# Patient Record
Sex: FEMALE | Race: WHITE | NOT HISPANIC OR LATINO | URBAN - METROPOLITAN AREA
[De-identification: names, ages, dates, MRNs, and addresses within clinical notes are randomized per-mention and may not be internally consistent; named-entity substitution may affect disease eponyms.]

---

## 2019-01-03 ENCOUNTER — EMERGENCY (EMERGENCY)
Facility: HOSPITAL | Age: 39
LOS: 1 days | Discharge: HOME | End: 2019-01-03
Attending: EMERGENCY MEDICINE
Payer: COMMERCIAL

## 2019-01-03 VITALS
SYSTOLIC BLOOD PRESSURE: 127 MMHG | OXYGEN SATURATION: 98 % | WEIGHT: 154.98 LBS | RESPIRATION RATE: 18 BRPM | HEART RATE: 76 BPM | HEIGHT: 67 IN | DIASTOLIC BLOOD PRESSURE: 84 MMHG | TEMPERATURE: 98 F

## 2019-01-03 PROCEDURE — 99283 EMERGENCY DEPT VISIT LOW MDM: CPT

## 2019-01-03 PROCEDURE — 73562 X-RAY EXAM OF KNEE 3: CPT

## 2019-01-03 PROCEDURE — 73562 X-RAY EXAM OF KNEE 3: CPT | Mod: 26,LT

## 2019-01-03 NOTE — ED PROVIDER NOTE - OBJECTIVE STATEMENT
This patient is a 38 year old woman who presents to the ER c/o left knee dislocation.  Patient was sitting on her couch and states that when her dog came on her lap and rolled onto her left leg her knee dislocated.  It then went back into place.  She denies pain at this time.  Patient states that she had a similar incident happen to her right knee many years ago that required surgery.

## 2019-01-03 NOTE — ED ADULT TRIAGE NOTE - CHIEF COMPLAINT QUOTE
States rolled knee on dog and popped out of place and states felt it pop back in. HX knee dislocation surgery in past.

## 2024-02-04 ENCOUNTER — EMERGENCY (EMERGENCY)
Facility: HOSPITAL | Age: 44
LOS: 1 days | Discharge: HOME | End: 2024-02-04
Attending: STUDENT IN AN ORGANIZED HEALTH CARE EDUCATION/TRAINING PROGRAM
Payer: COMMERCIAL

## 2024-02-04 VITALS
RESPIRATION RATE: 18 BRPM | SYSTOLIC BLOOD PRESSURE: 178 MMHG | OXYGEN SATURATION: 100 % | HEART RATE: 86 BPM | DIASTOLIC BLOOD PRESSURE: 90 MMHG

## 2024-02-04 VITALS
RESPIRATION RATE: 16 BRPM | DIASTOLIC BLOOD PRESSURE: 98 MMHG | OXYGEN SATURATION: 99 % | WEIGHT: 164.91 LBS | HEIGHT: 65 IN | SYSTOLIC BLOOD PRESSURE: 159 MMHG | HEART RATE: 84 BPM | TEMPERATURE: 98 F

## 2024-02-04 LAB
ALBUMIN SERPL ELPH-MCNC: 4 G/DL — SIGNIFICANT CHANGE UP (ref 3.3–5.2)
ALP SERPL-CCNC: 25 U/L — LOW (ref 40–120)
ALT FLD-CCNC: 23 U/L — SIGNIFICANT CHANGE UP
ANION GAP SERPL CALC-SCNC: 16 MMOL/L — SIGNIFICANT CHANGE UP (ref 5–17)
APTT BLD: 29.2 SEC — SIGNIFICANT CHANGE UP (ref 24.5–35.6)
AST SERPL-CCNC: 21 U/L — SIGNIFICANT CHANGE UP
BASOPHILS # BLD AUTO: 0.05 K/UL — SIGNIFICANT CHANGE UP (ref 0–0.2)
BASOPHILS NFR BLD AUTO: 0.7 % — SIGNIFICANT CHANGE UP (ref 0–2)
BILIRUB SERPL-MCNC: 0.3 MG/DL — LOW (ref 0.4–2)
BUN SERPL-MCNC: 11.4 MG/DL — SIGNIFICANT CHANGE UP (ref 8–20)
CALCIUM SERPL-MCNC: 8.8 MG/DL — SIGNIFICANT CHANGE UP (ref 8.4–10.5)
CHLORIDE SERPL-SCNC: 100 MMOL/L — SIGNIFICANT CHANGE UP (ref 96–108)
CO2 SERPL-SCNC: 21 MMOL/L — LOW (ref 22–29)
CREAT SERPL-MCNC: 0.96 MG/DL — SIGNIFICANT CHANGE UP (ref 0.5–1.3)
EGFR: 76 ML/MIN/1.73M2 — SIGNIFICANT CHANGE UP
EOSINOPHIL # BLD AUTO: 0.21 K/UL — SIGNIFICANT CHANGE UP (ref 0–0.5)
EOSINOPHIL NFR BLD AUTO: 3.1 % — SIGNIFICANT CHANGE UP (ref 0–6)
GLUCOSE SERPL-MCNC: 122 MG/DL — HIGH (ref 70–99)
HCT VFR BLD CALC: 41.7 % — SIGNIFICANT CHANGE UP (ref 34.5–45)
HGB BLD-MCNC: 14.7 G/DL — SIGNIFICANT CHANGE UP (ref 11.5–15.5)
IMM GRANULOCYTES NFR BLD AUTO: 0.3 % — SIGNIFICANT CHANGE UP (ref 0–0.9)
INR BLD: 0.94 RATIO — SIGNIFICANT CHANGE UP (ref 0.85–1.18)
LYMPHOCYTES # BLD AUTO: 1.33 K/UL — SIGNIFICANT CHANGE UP (ref 1–3.3)
LYMPHOCYTES # BLD AUTO: 19.4 % — SIGNIFICANT CHANGE UP (ref 13–44)
MCHC RBC-ENTMCNC: 33.3 PG — SIGNIFICANT CHANGE UP (ref 27–34)
MCHC RBC-ENTMCNC: 35.3 GM/DL — SIGNIFICANT CHANGE UP (ref 32–36)
MCV RBC AUTO: 94.3 FL — SIGNIFICANT CHANGE UP (ref 80–100)
MONOCYTES # BLD AUTO: 0.35 K/UL — SIGNIFICANT CHANGE UP (ref 0–0.9)
MONOCYTES NFR BLD AUTO: 5.1 % — SIGNIFICANT CHANGE UP (ref 2–14)
NEUTROPHILS # BLD AUTO: 4.91 K/UL — SIGNIFICANT CHANGE UP (ref 1.8–7.4)
NEUTROPHILS NFR BLD AUTO: 71.4 % — SIGNIFICANT CHANGE UP (ref 43–77)
PLATELET # BLD AUTO: 262 K/UL — SIGNIFICANT CHANGE UP (ref 150–400)
POTASSIUM SERPL-MCNC: 4 MMOL/L — SIGNIFICANT CHANGE UP (ref 3.5–5.3)
POTASSIUM SERPL-SCNC: 4 MMOL/L — SIGNIFICANT CHANGE UP (ref 3.5–5.3)
PROT SERPL-MCNC: 6.7 G/DL — SIGNIFICANT CHANGE UP (ref 6.6–8.7)
PROTHROM AB SERPL-ACNC: 10.5 SEC — SIGNIFICANT CHANGE UP (ref 9.5–13)
RBC # BLD: 4.42 M/UL — SIGNIFICANT CHANGE UP (ref 3.8–5.2)
RBC # FLD: 12.6 % — SIGNIFICANT CHANGE UP (ref 10.3–14.5)
SODIUM SERPL-SCNC: 137 MMOL/L — SIGNIFICANT CHANGE UP (ref 135–145)
WBC # BLD: 6.87 K/UL — SIGNIFICANT CHANGE UP (ref 3.8–10.5)
WBC # FLD AUTO: 6.87 K/UL — SIGNIFICANT CHANGE UP (ref 3.8–10.5)

## 2024-02-04 PROCEDURE — 99284 EMERGENCY DEPT VISIT MOD MDM: CPT | Mod: 25

## 2024-02-04 PROCEDURE — 73564 X-RAY EXAM KNEE 4 OR MORE: CPT

## 2024-02-04 PROCEDURE — 36415 COLL VENOUS BLD VENIPUNCTURE: CPT

## 2024-02-04 PROCEDURE — 96374 THER/PROPH/DIAG INJ IV PUSH: CPT

## 2024-02-04 PROCEDURE — 85025 COMPLETE CBC W/AUTO DIFF WBC: CPT

## 2024-02-04 PROCEDURE — 99285 EMERGENCY DEPT VISIT HI MDM: CPT

## 2024-02-04 PROCEDURE — 84702 CHORIONIC GONADOTROPIN TEST: CPT

## 2024-02-04 PROCEDURE — 80053 COMPREHEN METABOLIC PANEL: CPT

## 2024-02-04 PROCEDURE — 85610 PROTHROMBIN TIME: CPT

## 2024-02-04 PROCEDURE — 85730 THROMBOPLASTIN TIME PARTIAL: CPT

## 2024-02-04 PROCEDURE — 73564 X-RAY EXAM KNEE 4 OR MORE: CPT | Mod: 26,LT,76

## 2024-02-04 RX ORDER — FENTANYL CITRATE 50 UG/ML
50 INJECTION INTRAVENOUS ONCE
Refills: 0 | Status: DISCONTINUED | OUTPATIENT
Start: 2024-02-04 | End: 2024-02-04

## 2024-02-04 RX ORDER — SODIUM CHLORIDE 9 MG/ML
1000 INJECTION INTRAMUSCULAR; INTRAVENOUS; SUBCUTANEOUS ONCE
Refills: 0 | Status: COMPLETED | OUTPATIENT
Start: 2024-02-04 | End: 2024-02-04

## 2024-02-04 RX ADMIN — SODIUM CHLORIDE 1000 MILLILITER(S): 9 INJECTION INTRAMUSCULAR; INTRAVENOUS; SUBCUTANEOUS at 11:13

## 2024-02-04 RX ADMIN — FENTANYL CITRATE 50 MICROGRAM(S): 50 INJECTION INTRAVENOUS at 11:10

## 2024-02-04 NOTE — ED ADULT NURSE NOTE - NSFALLRISKINTERV_ED_ALL_ED

## 2024-02-04 NOTE — ED PROVIDER NOTE - CARE PROVIDER_API CALL
Oswald Hernandez  Orthopaedic Surgery  [address] [place] [id number]  Phone: [phone]  Fax: [phone]  Follow Up Time: 1-3 Days

## 2024-02-04 NOTE — ED ADULT TRIAGE NOTE - CHIEF COMPLAINT QUOTE
pt BIBA from home c/o pain to left knee after her large dog ran into her leg sideways. As per EMS, patella vs knee dislocation. Unable to examine patient secondary to pain

## 2024-02-04 NOTE — ED ADULT NURSE NOTE - OBJECTIVE STATEMENT
c/o left knee pain and deformity. Pt stated her dog ran into her knee and she felt immediate pain and spasms to left knee. Pt denies HA, dizziness, SOB, N/V/D, CP, palpitations, numbness/tingling. Pt Aox4, speaking coherently, respirations even and unlabored on RA, skin warm and dry, pt able to move all extremities.  in place.

## 2024-02-04 NOTE — ED PROVIDER NOTE - NSFOLLOWUPINSTRUCTIONS_ED_ALL_ED_FT
Please follow-up with an Orthopedics doctor.  Please call for an appointment in the next 48 hours but if you cannot follow-up with your primary care doctor please return to the Emergency Department for any urgent issues.    You were given a copy of the tests performed today.  Please bring the results with you and review them with your primary care doctor.    If you have any worsening of symptoms or any other concerns please return to the Emergency Department immediately.    Please continue taking your home medications as directed.    ----------  Patellar Dislocation  A person's knee joint, showing the femur and the patella.  The kneecap (patella) is located in a groove in front of the lower end of your thighbone (femur). This groove is called the patellofemoral groove. A patellar dislocation occurs when your kneecap (patella) slips all the way out of the groove. It usually occurs toward the outside of the leg. If the patella slips partly out of the groove, it is called a patellar subluxation.    What are the causes?  This condition is caused by:  A force on the knee.  Sports injuries.  Twisting the knee when the foot is planted.  What increases the risk?  A patellar dislocation is more likely to occur in:  Women.  People who play certain types of sports, including:  Sports that have quick turns or changes in direction, or where there is contact, like soccer.  Sports that require jumping, such as basketball or volleyball.  Sports in which cleats are worn.  People who have had this condition before.  What are the signs or symptoms?  Symptoms of this condition include:  Sudden, severe knee pain.  Swelling of the knee.  Not being able to straighten or bend the knee.  Numbness and tingling or skin that is cool or pale below the injured knee.  A popping sensation, followed by a feeling that something is out of place.  A misshapen knee.  Being able to move the kneecap too much from left to right (hypermobile patella).  How is this diagnosed?  This condition may be diagnosed with:  A physical exam.  An X-ray or MRI. These may be done to see:  The position of the patella and ligaments that hold the patella in place.  If a bone is broken.  The alignment of the knee.  Any damage to the cartilage.  In some cases, your health care provider may look inside your knee joint with a surgical instrument called an arthroscope. This may be done to make sure you have no loose cartilage in your joint.    How is this treated?  Your patella may move back into place on its own when you straighten your knee, or your health care provider will move it back into place. After your patella is back in its normal position, other treatments may be done, including:  Wearing a knee brace to keep your knee from moving (immobilized) while it heals.  Doing exercises that help improve strength and movement in your knee. Your health care provider may recommend that you see a physical therapist.  Taking medicine or applying ice to help with pain and inflammation.  Having surgery to prevent the patella from slipping out of place or to clean out any loose cartilage in your joint. This may be needed if other treatments do not help or if the condition keeps happening.  Follow these instructions at home:  If you have a removable brace:    A brace on a person's knee.  Wear the brace as told by your health care provider. Remove it only as told by your health care provider.  Check the skin around the brace every day. Tell your health care provider about any concerns.  Loosen the brace if your toes tingle, become numb, or turn cold and blue.  Keep the brace dry and clean.  If the brace is not waterproof:  Do not let it get wet.  Cover it with a watertight covering when you take a bath or shower.  Managing pain, stiffness, and swelling    Bag of ice on a towel on the skin.  If directed, put ice on the injured area. To do this:  If you have a removable brace, remove it as told by your health care provider.  Put ice in a plastic bag.  Place a towel between your skin and the bag.  Leave the ice on for 20 minutes, 2–3 times a day.  Remove the ice if your skin turns bright red. This is very important. If you cannot feel pain, heat, or cold, you have a greater risk of damage to the area.  Move your toes often to avoid stiffness and to lessen swelling.  Raise (elevate) the injured area above the level of your heart while you are sitting or lying down.  Activity    Do not use the injured limb to support your body weight until your health care provider says that you can. Use crutches as instructed.  Do exercises as told by your health care provider.  Return to your normal activities as instructed by your health care provider. Ask what activities are safe for you.  General instructions    Take over-the-counter and prescription medicines only as told by your health care provider.  Do not use any products that contain nicotine or tobacco. These products include cigarettes, chewing tobacco, and vaping devices, such as e-cigarettes. These can delay bone healing. If you need help quitting, ask your health care provider.  Keep all follow-up visits. This is important.  How is this prevented?  Stretch before and after any physical activity.  Give your body time to rest between periods of activity.  Make sure you use equipment that fits you.  Protect yourself against falls and injuries by doing activities in a safe way.  Contact a health care provider if:  Your pain or swelling does not get better.  Your knee catches or locks.  You have more warmth or redness (inflammation) in the knee.  Get help right away if:  You are unable to bend your knee.  The pain in your knee gets worse and is not relieved by medicine.  Your patella slips out of its normal position again.  You have new swelling, pain, or tenderness in any area of your affected leg.  Summary  Patellar dislocation occurs when your kneecap (patella) slips all the way out of the groove.  If your patella does not move back into place on its own, your health care provider will move it back into place.  Treatment may involve icing, medicine, a knee brace, and exercises as told by your health care provider.  In some cases, surgery may be done to prevent the patella from slipping out of place or to clean out any loose cartilage in your joint.  This information is not intended to replace advice given to you by your health care provider. Make sure you discuss any questions you have with your health care provider.

## 2024-02-04 NOTE — ED PROVIDER NOTE - PHYSICAL EXAMINATION
VITALS: reviewed  GEN: No apparent distress, A & O x 4  HEAD/EYES: NC/AT, PERRL, EOMI, anicteric sclerae, no conjunctival pallor  ENT: mucus membranes moist, trachea midline, no JVD, neck is supple  RESP: lungs CTA with equal breath sounds bilaterally, chest wall nontender and atraumatic  CV: heart with reg rhythm S1, S2, no murmur; distal pulses intact and symmetric bilaterally  ABDOMEN: normoactive bowel sounds, soft, nondistended, nontender, no palpable masses  : no CVAT  MSK: the back is without midline or lateral tenderness, there is no spinal deformity or stepoff and the back is ranged painlessly. the neck has no midline tenderness, deformity, or stepoff, and is ranged painlessly.  Knee Exam  Sensation: sensation intact to light touch in 5th/1st finger volar tip, inner/outer legs.  Motor: 5/5 strength of plantar/dorsal flexion of the ankle, Unable extension/flexion of the knee 2/2 pain.  ROM: full range of motion of right/left ankle, L patellar tenderness. Limited ROM on L knee 2/2 pain  Skin/Inspection: Displaced left patellar w/ tenderness. No erythema, no abrasion, no bruises, no effusion appreciated.   Vascular: CRT<2sec in all digits.      SKIN: warm, dry, no rash, no bruising, no cyanosis.  NEURO: alert, mentating appropriately, no facial asymmetry. gross sensation, motor, coordination are intact  PSYCH: Affect appropriate

## 2024-02-04 NOTE — ED PROVIDER NOTE - PATIENT PORTAL LINK FT
You can access the FollowMyHealth Patient Portal offered by [facility] by registering at the following website: [email or web address]. By joining [facility]’s FollowMyHealth portal, you will also be able to view your health information using other applications (apps) compatible with our system.

## 2024-02-04 NOTE — ED PROVIDER NOTE - ATTENDING CONTRIBUTION TO CARE
42 yo F BIBEMS from home c/o L knee pain after her dog ran into her leg sideways. Denies fall, head strike, or any other injury. Pt reports severe L knee pain. Has history of patellar dislocations in the past.  Ap - foot neurovascularly intact. patella self reduced as patient was moving in the bed. knee brace. outpt f/u with ortho

## 2024-02-04 NOTE — ED PROVIDER NOTE - OBJECTIVE STATEMENT
A 42 yo F with hx of L patella dislocation, BIBEMS from home c/o L knee pain after her dog ran into her leg sideways. Denies fall, head strike, or any other injury. Pt reports severe L knee pain.

## 2024-02-05 DIAGNOSIS — M25.562 PAIN IN LEFT KNEE: ICD-10-CM

## 2024-02-05 PROBLEM — S83.006A UNSPECIFIED DISLOCATION OF UNSPECIFIED PATELLA, INITIAL ENCOUNTER: Chronic | Status: ACTIVE | Noted: 2024-02-04

## 2024-02-05 PROBLEM — Z00.00 ENCOUNTER FOR PREVENTIVE HEALTH EXAMINATION: Status: ACTIVE | Noted: 2024-02-05

## 2024-02-06 ENCOUNTER — APPOINTMENT (OUTPATIENT)
Age: 44
End: 2024-02-06
Payer: COMMERCIAL

## 2024-02-06 VITALS
HEIGHT: 68 IN | WEIGHT: 150 LBS | DIASTOLIC BLOOD PRESSURE: 96 MMHG | BODY MASS INDEX: 22.73 KG/M2 | HEART RATE: 96 BPM | SYSTOLIC BLOOD PRESSURE: 142 MMHG

## 2024-02-06 DIAGNOSIS — Z82.3 FAMILY HISTORY OF STROKE: ICD-10-CM

## 2024-02-06 DIAGNOSIS — F17.200 NICOTINE DEPENDENCE, UNSPECIFIED, UNCOMPLICATED: ICD-10-CM

## 2024-02-06 DIAGNOSIS — Z82.49 FAMILY HISTORY OF ISCHEMIC HEART DISEASE AND OTHER DISEASES OF THE CIRCULATORY SYSTEM: ICD-10-CM

## 2024-02-06 DIAGNOSIS — F12.90 CANNABIS USE, UNSPECIFIED, UNCOMPLICATED: ICD-10-CM

## 2024-02-06 DIAGNOSIS — Z78.9 OTHER SPECIFIED HEALTH STATUS: ICD-10-CM

## 2024-02-06 PROCEDURE — 73564 X-RAY EXAM KNEE 4 OR MORE: CPT | Mod: LT

## 2024-02-06 PROCEDURE — 99204 OFFICE O/P NEW MOD 45 MIN: CPT

## 2024-02-06 RX ORDER — IBUPROFEN 800 MG/1
TABLET, FILM COATED ORAL
Refills: 0 | Status: ACTIVE | COMMUNITY

## 2024-02-06 NOTE — DISCUSSION/SUMMARY
[Medication Risks Reviewed] : Medication risks reviewed [Other: ____] : in [unfilled] [Surgical risks reviewed] : Surgical risks reviewed [de-identified] : 43 year old female presents today for an initial consultation of her left knee pain.  She does have a history of patellar dislocations the last was 4 years ago.  She is having significant pain and swelling in the knee.  I am concerned that she could have an osteochondral defect based on her most recent dislocation.  For that reason I recommend an MRI of her left knee for further evaluation and management.  She will continue to remain weightbearing as tolerated with the knee locked in extension with the use of her brace.  I will see her back after the MRI for repeat evaluation.  All questions were asked and answered.  She was advised to rest ice and elevate the leg as well as take anti-inflammatories including Tylenol and ibuprofen as needed for the pain

## 2024-02-06 NOTE — PHYSICAL EXAM
[Crutches] : ambulates with crutches [de-identified] : GENERAL APPEARANCE: Well nourished and hydrated, pleasant, alert, and oriented x 3. Appears their stated age.  HEENT: Normocephalic, extraocular eye motion intact. Nasal septum midline. Oral cavity clear. External auditory canal clear.  RESPIRATORY: Breath sounds clear and audible in all lobes. No wheezing, No accessory muscle use. CARDIOVASCULAR: No apparent abnormalities. No lower leg edema. No varicosities. Pedal pulses are palpable. NEUROLOGIC: Sensation is normal, no muscle weakness in the upper or lower extremities. DERMATOLOGIC: No apparent skin lesions, moist, warm, no rash. SPINE: Cervical spine appears normal and moves freely; thoracic spine appears normal and moves freely; lumbosacral spine appears normal and moves freely, normal, nontender. MUSCULOSKELETAL: Hands, wrists, and elbows are normal and move freely, shoulders are normal and move freely.  PSYCHIATRIC: Oriented to person, place, and time, insight and judgement were intact and the affect was normal. [de-identified] : Left knee exam shows large effusion, ROM was not checked due to the pain, tenderness around the patella, and full extensor mechanism strength. 5/5 motor strength in bilateral lower extremities. Sensory: Intact in bilateral lower extremities. DTRs: Biceps, brachioradialis, triceps, patellar, ankle and plantar 2+ and symmetric bilaterally. Pulses: dorsalis pedis, posterior tibial, femoral, popliteal, and radial 2+ and symmetric bilaterally. [de-identified] : 01/03/2019 - [facility]: 3 Views X-Ray of the Left Knee - IMPRESSION: * No radiographic osseous pathology. * Patella dagoberto deformity. If pain persist despite conservative therapy and soft tissue internal derangement or occult fracture is clinically suspected follow-up MRI recommended.  02/04/2024 - [facility]: Complete 4+ Views X-Ray of the Left Knee - IMPRESSION: * Reduction of patellar dislocation.  4 views of the left knee obtained the office today show no acute fracture or dislocation.  Possible small avulsion is noted off of the patella.  Patella alto is noted

## 2024-02-06 NOTE — ADDENDUM
[FreeTextEntry1] : This note was written by Kinjal Tovar, acting as the  for Dr. Montiel. This note accurately reflects the work and decisions made by Dr. Montiel.

## 2024-02-06 NOTE — HISTORY OF PRESENT ILLNESS
[Pain Location] : pain [] : left knee [Worsening] : worsening [Standing] : standing [Constant] : ~He/She~ states the symptoms seem to be constant [Sitting] : worsened by sitting [Running] : worsened by running [Walking] : worsened by walking [Knee Flexion] : worsened with knee flexion [Knee Extension] : worsened with knee extension [NSAIDs] : relieved by nonsteroidal anti-inflammatory drugs [___ days] : [unfilled] day(s) ago [Ice] : relieved by ice [Rest] : relieved by rest [de-identified] : 43 year old female presents today for an initial consultation for her left knee pain. She states the pain has been ongoing for the past 2 days following her most recent dislocation after her dog ran into the side of her knee. The patient has been taking ibuprofen and Advil as needed for the pain in her knee. The patient states that she has dislocated her bilateral knees on 3 occasions. Her first dislocation was 20 years ago on the right side and underwent surgery. Her 2nd dislocation occurred 5 years ago but it clicked back into place on its own as soon as she was placed on the stretcher. She states the recent dislocation was cause by her 200+ pound dog running into her from the side again like the previous dislocations but this time the knee did not pop back into place until she did it herself in the hospital after being provided pain medication. However, she states this time was different as it did not click into place, but rather slid/rolled back into place. The 3rd dislocation occurred on 02/04/2024. She states her ankle did not swell today. She is ambulating with the use of crutched and is wearing a knee brace for additional support. She works in a bank and stands all day. She states she has to drive daily to [place] for work. There is still significant swelling in the patient's knee. The patient states she is an avid  and  in her free time. [de-identified] : going up and down the stairs, rising from a seated position

## 2024-02-14 ENCOUNTER — OUTPATIENT (OUTPATIENT)
Dept: OUTPATIENT SERVICES | Facility: HOSPITAL | Age: 44
LOS: 1 days | End: 2024-02-14
Payer: COMMERCIAL

## 2024-02-14 ENCOUNTER — APPOINTMENT (OUTPATIENT)
Dept: MRI IMAGING | Facility: CLINIC | Age: 44
End: 2024-02-14
Payer: COMMERCIAL

## 2024-02-14 DIAGNOSIS — Z00.8 ENCOUNTER FOR OTHER GENERAL EXAMINATION: ICD-10-CM

## 2024-02-14 PROCEDURE — 73721 MRI JNT OF LWR EXTRE W/O DYE: CPT | Mod: 26,LT

## 2024-02-14 PROCEDURE — 73721 MRI JNT OF LWR EXTRE W/O DYE: CPT

## 2024-02-22 ENCOUNTER — APPOINTMENT (OUTPATIENT)
Dept: ORTHOPEDIC SURGERY | Facility: CLINIC | Age: 44
End: 2024-02-22
Payer: COMMERCIAL

## 2024-02-22 PROCEDURE — 99441: CPT

## 2024-03-05 ENCOUNTER — APPOINTMENT (OUTPATIENT)
Dept: ORTHOPEDIC SURGERY | Facility: CLINIC | Age: 44
End: 2024-03-05
Payer: COMMERCIAL

## 2024-03-05 VITALS
WEIGHT: 150 LBS | HEIGHT: 68 IN | HEART RATE: 96 BPM | SYSTOLIC BLOOD PRESSURE: 155 MMHG | DIASTOLIC BLOOD PRESSURE: 89 MMHG | BODY MASS INDEX: 22.73 KG/M2

## 2024-03-05 DIAGNOSIS — S83.005A UNSPECIFIED DISLOCATION OF LEFT PATELLA, INITIAL ENCOUNTER: ICD-10-CM

## 2024-03-05 PROCEDURE — 27508 TREATMENT OF THIGH FRACTURE: CPT

## 2024-03-05 PROCEDURE — 99214 OFFICE O/P EST MOD 30 MIN: CPT | Mod: 25

## 2024-03-05 NOTE — HISTORY OF PRESENT ILLNESS
[Pain Location] : pain [] : left knee [Worsening] : worsening [___ wks] : [unfilled] week(s) ago [Standing] : standing [Constant] : ~He/She~ states the symptoms seem to be constant [Sitting] : worsened by sitting [Running] : worsened by running [Walking] : worsened by walking [Knee Flexion] : worsened with knee flexion [Knee Extension] : worsened with knee extension [NSAIDs] : relieved by nonsteroidal anti-inflammatory drugs [Ice] : relieved by ice [Rest] : relieved by rest [de-identified] : 43 year old female presents today for follow-up of her left knee pain.  Patient has been using crutches as well as her knee immobilizer.  She states the pain has been improving.  However she does feel stiffness in the knee.  Has not yet started physical therapy.  Denies any falls or trauma [de-identified] : going up and down the stairs, rising from a seated position

## 2024-03-05 NOTE — PHYSICAL EXAM
[Crutches] : ambulates with crutches [de-identified] : Left knee exam shows mild effusion, ROM 5-90, tenderness around the patella, and full extensor mechanism strength. TTP over distal femur 5/5 motor strength in bilateral lower extremities. Sensory: Intact in bilateral lower extremities. DTRs: Biceps, brachioradialis, triceps, patellar, ankle and plantar 2+ and symmetric bilaterally. Pulses: dorsalis pedis, posterior tibial, femoral, popliteal, and radial 2+ and symmetric bilaterally. [de-identified] : 02/14/2024 - [facility]: MRI of the Left Knee without Contrast - IMPRESSION: (1) Sequelae of prior patellar dislocation including tear of the medial patellofemoral ligament/medial extensor retinaculum at its attachment on the patella, osseous contusion of the medial patella, and low-grade impaction fracture of the lateral femoral condyle. (2) No disruption of the articular cartilage. (3) Small knee joint effusion. (4) Edema in the subcutaneous fat surrounding the knee joint.  02/04/2024 - [facility]: Complete 4+ Views X-Ray of the Left Knee - IMPRESSION: * Reduction of patellar dislocation

## 2024-03-05 NOTE — DISCUSSION/SUMMARY
[Medication Risks Reviewed] : Medication risks reviewed [Surgical risks reviewed] : Surgical risks reviewed [de-identified] : Patient is a 43-year-old female here today status post left knee patella dislocation and impaction fracture of the distal femoral condyle.  I recommended continued conservative treatment at this time.  We discussed low impact activity and exercise.  I recommend that she DC the brace.  We recommended to begin physical therapy.  I have talked her about VMO strengthening.  I discussed Tylenol and anti-inflammatories as needed for the pain.  I will see her back in 2 months for repeat evaluation and x-ray.  Sooner if any issues arise.  All questions were asked and answered

## 2024-05-08 ENCOUNTER — APPOINTMENT (OUTPATIENT)
Dept: ORTHOPEDIC SURGERY | Facility: CLINIC | Age: 44
End: 2024-05-08
Payer: COMMERCIAL

## 2024-05-08 VITALS
HEART RATE: 87 BPM | DIASTOLIC BLOOD PRESSURE: 93 MMHG | SYSTOLIC BLOOD PRESSURE: 143 MMHG | HEIGHT: 68 IN | WEIGHT: 150 LBS | BODY MASS INDEX: 22.73 KG/M2

## 2024-05-08 DIAGNOSIS — S72.402D UNSPECIFIED FRACTURE OF LOWER END OF LEFT FEMUR, SUBSEQUENT ENCOUNTER FOR CLOSED FRACTURE WITH ROUTINE HEALING: ICD-10-CM

## 2024-05-08 PROCEDURE — 73560 X-RAY EXAM OF KNEE 1 OR 2: CPT | Mod: LT

## 2024-05-08 PROCEDURE — 99214 OFFICE O/P EST MOD 30 MIN: CPT | Mod: 24

## 2024-05-08 NOTE — PHYSICAL EXAM
[Crutches] : ambulates with crutches [de-identified] : Left knee exam shows mild effusion, ROM 5-100, tenderness around the patella, and full extensor mechanism strength. TTP over distal femur 5/5 motor strength in bilateral lower extremities. Sensory: Intact in bilateral lower extremities. DTRs: Biceps, brachioradialis, triceps, patellar, ankle and plantar 2+ and symmetric bilaterally. Pulses: dorsalis pedis, posterior tibial, femoral, popliteal, and radial 2+ and symmetric bilaterally. [de-identified] : 2 views of the left knee obtained the office today show no acute fracture or dislocation

## 2024-05-08 NOTE — DISCUSSION/SUMMARY
[Medication Risks Reviewed] : Medication risks reviewed [Surgical risks reviewed] : Surgical risks reviewed [de-identified] : patient is a 43-year-old female here today for follow-up of her left patella dislocation distal femur fracture.Dispo aggressive physical therapy she is still having limitations in range of motion of her knee as well as continued pain.  She is now 3 months from her injury.  For that reason I do think that a repeat MRI is indicated in order to evaluate for any further internal derangement of her knee.  As well as to evaluate for fracture healing.  She will continue with her low impact activity and exercise.  Continue with physical therapy.  I will see her back after the MRI for repeat evaluation management.  All questions were asked and answered

## 2024-05-19 ENCOUNTER — OUTPATIENT (OUTPATIENT)
Dept: OUTPATIENT SERVICES | Facility: HOSPITAL | Age: 44
LOS: 1 days | End: 2024-05-19
Payer: COMMERCIAL

## 2024-05-19 ENCOUNTER — APPOINTMENT (OUTPATIENT)
Dept: MRI IMAGING | Facility: CLINIC | Age: 44
End: 2024-05-19
Payer: COMMERCIAL

## 2024-05-19 DIAGNOSIS — Z00.8 ENCOUNTER FOR OTHER GENERAL EXAMINATION: ICD-10-CM

## 2024-05-19 DIAGNOSIS — S72.402D UNSPECIFIED FRACTURE OF LOWER END OF LEFT FEMUR, SUBSEQUENT ENCOUNTER FOR CLOSED FRACTURE WITH ROUTINE HEALING: ICD-10-CM

## 2024-05-19 PROCEDURE — 73721 MRI JNT OF LWR EXTRE W/O DYE: CPT

## 2024-05-19 PROCEDURE — 73721 MRI JNT OF LWR EXTRE W/O DYE: CPT | Mod: 26,LT

## 2024-05-29 ENCOUNTER — APPOINTMENT (OUTPATIENT)
Dept: ORTHOPEDIC SURGERY | Facility: CLINIC | Age: 44
End: 2024-05-29
Payer: COMMERCIAL

## 2024-05-29 PROCEDURE — 99441: CPT | Mod: 24
